# Patient Record
Sex: MALE
[De-identification: names, ages, dates, MRNs, and addresses within clinical notes are randomized per-mention and may not be internally consistent; named-entity substitution may affect disease eponyms.]

---

## 2017-10-25 ENCOUNTER — HOSPITAL ENCOUNTER (EMERGENCY)
Dept: HOSPITAL 14 - H.ER | Age: 72
Discharge: HOME | End: 2017-10-25
Payer: MEDICARE

## 2017-10-25 VITALS
OXYGEN SATURATION: 100 % | SYSTOLIC BLOOD PRESSURE: 144 MMHG | RESPIRATION RATE: 16 BRPM | DIASTOLIC BLOOD PRESSURE: 89 MMHG | TEMPERATURE: 97.6 F | HEART RATE: 75 BPM

## 2017-10-25 VITALS — BODY MASS INDEX: 26.4 KG/M2

## 2017-10-25 DIAGNOSIS — J44.9: ICD-10-CM

## 2017-10-25 DIAGNOSIS — G30.9: ICD-10-CM

## 2017-10-25 DIAGNOSIS — F02.80: ICD-10-CM
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
## 2017-10-25 DIAGNOSIS — S62.91XA: Primary | ICD-10-CM

## 2017-10-25 NOTE — ED PDOC
Upper Extremity Pain/Injury


Time Seen by Provider: 10/25/17 21:52


Chief Complaint (Nursing): Abnormal Skin Integrity


Chief Complaint (Provider): hand injury


History Per: Patient


Additional Complaint(s): 





72yo M in ED for eval of fall injury-sustained tonight via a FOOSH injury with 

injury to face- pt has a neurological  d/o causes stability issues(lewy body 

dementia)-which is why he trip on uneven curb and fell. negative for: LOC, 

headache, nausea vomiting vision changes dizziness change in speech, mentation, 

numbness.tingling to UE/LE, CP, SOB. 


PT with pain to right hand-fifth digit with swelling deformity and tingling/

numbness and pain with ROM.  





Past Medical History


Reviewed: Historical Data, Nursing Documentation, Vital Signs


Vital Signs: 


 Last Vital Signs











Temp  97.6 F   10/25/17 21:42


 


Pulse  75   10/25/17 21:42


 


Resp  16   10/25/17 21:42


 


BP  144/89   10/25/17 21:42


 


Pulse Ox  100   10/25/17 21:42














- Medical History


PMH: Alzheimer's Disease, Anemia, COPD, Dementia, Fractures (ribs)


   Denies: Chronic Kidney Disease





- Surgical History


Surgical History: Appendectomy





- Family History


Family History: States: No Known Family Hx





- Home Medications


Home Medications: 


 Ambulatory Orders











 Medication  Instructions  Recorded


 


Folic Acid 0.4 mg PO DAILY 02/22/15


 


Memantine [Namenda] 10 mg PO BID 02/22/15


 


Mirabegron [Myrbetriq] 50 mg PO DAILY 02/22/15


 


Bupropion HCl [Bupropion 150 mg PO DAILY 09/04/15





Hydrochloride Xl]  


 


Donepezil HCl [Donepezil HCl] 10 mg PO DAILY 09/04/15


 


Ergocalciferol [Calciferol] 5,000 iu PO DAILY 09/04/15


 


Finasteride [Proscar] 5 mg PO DAILY 09/04/15


 


Ibuprofen [Motrin] 400 mg PO Q6 #30 tab 10/25/17














- Allergies


Allergies/Adverse Reactions: 


 Allergies











Allergy/AdvReac Type Severity Reaction Status Date / Time


 


morphine Allergy  ANAPHYLAXIS Verified 10/25/17 21:42














Review of Systems


ROS Statement: Except As Marked, All Systems Reviewed And Found Negative


Musculoskeletal: Positive for: Hand Pain





Physical Exam





- Reviewed


Nursing Documentation Reviewed: Yes


Vital Signs Reviewed: Yes





- Physical Exam


Appears: Positive for: Well, Non-toxic, No Acute Distress


Skin: Positive for: Normal Color, Warm, DRY


Cardiovascular/Chest: Positive for: Regular Rate, Rhythm


Respiratory: Positive for: CNT, Normal Breath Sounds


Extremity: Positive for: Other (right hand: 5th digit-swelling deformity pain 

nuerovasc intact)


Neurologic/Psych: Positive for: Alert, Oriented





- ECG


O2 Sat by Pulse Oximetry: 100





- Radiology


X-Ray: Interpreted by Me


X-Ray Interpretation: Fracture





- Progress


ED Course And Treament: 





Pt will get xray, motrin for pain and ice pack to area ring place don ring 

finger right hand was removed with effort-lubrication was used. 





Medical Decision Making


Medical Decision Making: 





pt placed in an ulnar gutter-given motrin 600mg


will have f.u with MD bart pt is a [facility] pt. 





Procedures





- Splinting


Location: right


Hand-Made Type: orthoglass


Splint: thumb spica


Pre-Proc Neuro Vasc Exam: normal


Post-Proc Neuro Vasc Exam: normal





Disposition





- Clinical Impression


Clinical Impression: 


 Hand fracture








- Patient ED Disposition


Is Patient to be Admitted: No


Counseled Patient/Family Regarding: Studies Performed, Diagnosis, Need For 

Followup, Rx Given





- Disposition


Referrals: 


Darlene Mendez MD [Staff Provider] - 


Disposition: Routine/Home


Disposition Time: 22:39


Condition: STABLE


Prescriptions: 


Ibuprofen [Motrin] 400 mg PO Q6 #30 tab


Instructions:  Hand Fracture (ED)

## 2017-10-26 NOTE — RAD
PROCEDURE:  Right Hand Radiographs.



HISTORY:

trauma attn fith digit



COMPARISON:

None.



FINDINGS:



BONES:

Fracture at the base of the proximal phalanx 5th digit. The finding 

is marked on the study for review.  



JOINTS:

Normal. No osteoarthritic changes. 



SOFT TISSUES:

Soft tissue swelling attests to the acuity of the fracture. 



OTHER FINDINGS:

None.



IMPRESSION:

Non articular fracture proximal aspect proximal phalanx right 5th 

digit. 



_____________________________________________



Please note: No preliminary interpretation of this examination 

rendered by emergency department personnel (Physician and/or PA 

declined to provide preliminary report of their findings/ 

observations).

## 2018-02-27 ENCOUNTER — HOSPITAL ENCOUNTER (INPATIENT)
Dept: HOSPITAL 31 - C.ER | Age: 73
LOS: 6 days | Discharge: NURSING, REHAB OR LONG-TERM CARE | DRG: 563 | End: 2018-03-05
Attending: INTERNAL MEDICINE | Admitting: INTERNAL MEDICINE
Payer: MEDICARE

## 2018-02-27 VITALS — BODY MASS INDEX: 26.4 KG/M2

## 2018-02-27 DIAGNOSIS — S42.201A: Primary | ICD-10-CM
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
## 2018-02-27 DIAGNOSIS — Z87.81: ICD-10-CM

## 2018-02-27 DIAGNOSIS — F02.80: ICD-10-CM
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
## 2018-02-27 DIAGNOSIS — Z87.891: ICD-10-CM

## 2018-02-27 DIAGNOSIS — J44.9: ICD-10-CM

## 2018-02-27 DIAGNOSIS — Z90.49: ICD-10-CM

## 2018-02-27 DIAGNOSIS — S20.219A: ICD-10-CM

## 2018-02-27 DIAGNOSIS — G30.9: ICD-10-CM

## 2018-02-27 DIAGNOSIS — Z87.440: ICD-10-CM

## 2018-02-27 DIAGNOSIS — N39.0: ICD-10-CM

## 2018-02-27 LAB
ALBUMIN SERPL-MCNC: 3.9 G/DL (ref 3.5–5)
ALBUMIN/GLOB SERPL: 1.3 {RATIO} (ref 1–2.1)
ALT SERPL-CCNC: 28 U/L (ref 21–72)
APTT BLD: 34 SECONDS (ref 21–34)
AST SERPL-CCNC: 36 U/L (ref 17–59)
BASOPHILS # BLD AUTO: 0 K/UL (ref 0–0.2)
BASOPHILS NFR BLD: 0.3 % (ref 0–2)
BUN SERPL-MCNC: 25 MG/DL (ref 9–20)
CALCIUM SERPL-MCNC: 8.8 MG/DL (ref 8.6–10.4)
EOSINOPHIL # BLD AUTO: 0 K/UL (ref 0–0.7)
EOSINOPHIL NFR BLD: 0.4 % (ref 0–4)
ERYTHROCYTE [DISTWIDTH] IN BLOOD BY AUTOMATED COUNT: 12.4 % (ref 11.5–14.5)
GFR NON-AFRICAN AMERICAN: > 60
HGB BLD-MCNC: 13.9 G/DL (ref 12–18)
INR PPP: 1.1
LYMPHOCYTES # BLD AUTO: 0.9 K/UL (ref 1–4.3)
LYMPHOCYTES NFR BLD AUTO: 10.2 % (ref 20–40)
MCH RBC QN AUTO: 31.9 PG (ref 27–31)
MCHC RBC AUTO-ENTMCNC: 35 G/DL (ref 33–37)
MCV RBC AUTO: 91.1 FL (ref 80–94)
MONOCYTES # BLD: 0.8 K/UL (ref 0–0.8)
MONOCYTES NFR BLD: 8.4 % (ref 0–10)
NEUTROPHILS # BLD: 7.4 K/UL (ref 1.8–7)
NEUTROPHILS NFR BLD AUTO: 80.7 % (ref 50–75)
NRBC BLD AUTO-RTO: 0 % (ref 0–2)
PLATELET # BLD: 161 K/UL (ref 130–400)
PMV BLD AUTO: 9.6 FL (ref 7.2–11.7)
PROTHROMBIN TIME: 12 SECONDS (ref 9.7–12.2)
RBC # BLD AUTO: 4.36 MIL/UL (ref 4.4–5.9)
WBC # BLD AUTO: 9.2 K/UL (ref 4.8–10.8)

## 2018-02-27 NOTE — CP.PCM.HP
Present on Admission





- Present on Admission


Any Indicators Present on Admission: No





Past Patient History





- Infectious Disease


Hx of Infectious Diseases: None





- Tetanus Immunizations


Tetanus Immunization: Unknown





- Past Medical History & Family History


Past Medical History?: Yes





- Past Social History


Smoking Status: Former Smoker





- CARDIAC


Hx Hypotension: Yes (orthostatic)





- PULMONARY


Hx Chronic Obstructive Pulmonary Disease (COPD): Yes





- NEUROLOGICAL


Hx Alzheimer's Disease: Yes


Hx Dementia: Yes





- HEENT


Hx Cataracts: Yes





- RENAL


Hx Chronic Kidney Disease: No





- ENDOCRINE/METABOLIC


Hx Endocrine Disorders: No





- HEMATOLOGICAL/ONCOLOGICAL


Hx Anemia: Yes





- INTEGUMENTARY


Hx Dermatological Problems: No





- MUSCULOSKELETAL/RHEUMATOLOGICAL


Hx Fractures: Yes (ribs)





- GASTROINTESTINAL


Hx Gastrointestinal Disorders: No





- GENITOURINARY/GYNECOLOGICAL


Hx Prostate Problems: Yes


Hx Urinary Tract Infection: Yes


Other/Comment: frequent urine infections





- PSYCHIATRIC


Hx Substance Use: No





- SURGICAL HISTORY


Hx Appendectomy: Yes





- ANESTHESIA


Hx Anesthesia: Yes


Hx Anesthesia Reactions: No





Meds


Allergies/Adverse Reactions: 


 Allergies











Allergy/AdvReac Type Severity Reaction Status Date / Time


 


morphine Allergy  ANAPHYLAXIS Verified 02/27/18 16:58














Results





- Vital Signs


Recent Vital Signs: 





 Last Vital Signs











Temp  98.3 F   02/27/18 16:53


 


Pulse  83   02/27/18 17:09


 


Resp  18   02/27/18 17:09


 


BP  200/95 H  02/27/18 17:09


 


Pulse Ox  96   02/27/18 19:13














- Labs


Result Diagrams: 


 02/27/18 19:46





 02/27/18 19:46


Labs: 





 Laboratory Results - last 24 hr











  02/27/18 02/27/18 02/27/18





  19:46 19:46 19:46


 


WBC  9.2  


 


RBC  4.36 L  


 


Hgb  13.9  


 


Hct  39.7  


 


MCV  91.1  


 


MCH  31.9 H  


 


MCHC  35.0  


 


RDW  12.4  


 


Plt Count  161  


 


MPV  9.6  


 


Neut % (Auto)  80.7 H  


 


Lymph % (Auto)  10.2 L  


 


Mono % (Auto)  8.4  


 


Eos % (Auto)  0.4  


 


Baso % (Auto)  0.3  


 


Neut # (Auto)  7.4 H  


 


Lymph # (Auto)  0.9 L  


 


Mono # (Auto)  0.8  


 


Eos # (Auto)  0.0  


 


Baso # (Auto)  0.0  


 


PT   12.0 


 


INR   1.1 


 


APTT   34 


 


Sodium    142


 


Potassium    4.3


 


Chloride    103


 


Carbon Dioxide    30


 


Anion Gap    13


 


BUN    25 H


 


Creatinine    0.9


 


Est GFR ( Amer)    > 60


 


Est GFR (Non-Af Amer)    > 60


 


Random Glucose    145 H


 


Calcium    8.8


 


Total Bilirubin    0.7


 


AST    36


 


ALT    28


 


Alkaline Phosphatase    53


 


Total Protein    6.9


 


Albumin    3.9


 


Globulin    3.0


 


Albumin/Globulin Ratio    1.3


 


Blood Type   


 


Antibody Screen   














  02/27/18





  19:46


 


WBC 


 


RBC 


 


Hgb 


 


Hct 


 


MCV 


 


MCH 


 


MCHC 


 


RDW 


 


Plt Count 


 


MPV 


 


Neut % (Auto) 


 


Lymph % (Auto) 


 


Mono % (Auto) 


 


Eos % (Auto) 


 


Baso % (Auto) 


 


Neut # (Auto) 


 


Lymph # (Auto) 


 


Mono # (Auto) 


 


Eos # (Auto) 


 


Baso # (Auto) 


 


PT 


 


INR 


 


APTT 


 


Sodium 


 


Potassium 


 


Chloride 


 


Carbon Dioxide 


 


Anion Gap 


 


BUN 


 


Creatinine 


 


Est GFR ( Amer) 


 


Est GFR (Non-Af Amer) 


 


Random Glucose 


 


Calcium 


 


Total Bilirubin 


 


AST 


 


ALT 


 


Alkaline Phosphatase 


 


Total Protein 


 


Albumin 


 


Globulin 


 


Albumin/Globulin Ratio 


 


Blood Type  A POSITIVE


 


Antibody Screen  Negative














Assessment & Plan





- Assessment and Plan (Free Text)


Plan: 





orthofollwup


nemaned


protonix


amy lvoenox


scd


other mx as ordered


will monitor cbc closelyh 


may need rehab

## 2018-02-27 NOTE — C.PDOC
History Of Present Illness


72-year-old male presents to the ED for evaluation of right shoulder pain which 

began after he sustained a fall PTA. Patient states he accidentally tripped 

over his foot and fell onto his right side. Patient notes he hit the right side 

of his head on the floor, but denies LOC. The fall was witnessed by his wife, 

who is present at bedside. Patient also denies symptoms prior to fall such as 

chest pain, palpitations, SOB, headache, dizziness, sensory changes, extremity 

weakness.


Time Seen by Provider: 02/27/18 17:05


Chief Complaint (Nursing): Upper Extremity Problem/Injury


History Per: Patient, Family


History/Exam Limitations: no limitations


Onset/Duration Of Symptoms: Hrs


Current Symptoms Are (Timing): Better


Quality: "Pain"


Severity: Mild


Additional History Per: Patient





Past Medical History


Reviewed: Historical Data, Nursing Documentation, Vital Signs


Vital Signs: 


 Last Vital Signs











Temp  98.5 F   03/05/18 07:00


 


Pulse  76   03/05/18 07:00


 


Resp  18   03/05/18 07:00


 


BP  146/80   03/05/18 07:00


 


Pulse Ox  95   03/05/18 07:00














- Medical History


PMH: Alzheimer's Disease, Anemia, COPD, Dementia, Fractures (ribs)


Surgical History: Appendectomy





- [facility] Procedures








INJECT/INFUSE NEC (01/18/15)








Family History: States: No Known Family Hx





- Social History


Hx Alcohol Use: No


Hx Substance Use: No





Review Of Systems


Except As Marked, All Systems Reviewed And Found Negative.


Cardiovascular: Negative for: Chest Pain, Palpitations


Respiratory: Negative for: Shortness of Breath


Gastrointestinal: Negative for: Nausea, Vomiting, Abdominal Pain, Diarrhea


Musculoskeletal: Positive for: Shoulder Pain (right )


Neurological: Positive for: Other (+head injury, no LOC ).  Negative for: 

Weakness, Numbness, Confusion, Seizures, Dizziness





Physical Exam





- Physical Exam


Appears: Well, Non-toxic, No Acute Distress


Skin: Normal Color, Warm, Dry


Head: Normacephalic, Other (erythematous mild wound/abrasion to right temporal 

area)


Eye(s): bilateral: Normal Inspection


Oral Mucosa: Moist


Neck: Normal, Normal ROM, No Midline Cervical Tenderness, No Paracervical 

Tenderness, No Step Off Deformity, Supple


Cardiovascular: Rhythm Regular


Respiratory: Normal Breath Sounds, No Rales, No Rhonchi, No Wheezing


Gastrointestinal/Abdominal: Normal Exam, Bowel Sounds, Soft, No Tenderness, No 

Guarding, No Rebound


Extremity: Normal ROM, Capillary Refill (< 2sec all digits ), No Deformity, 

Swelling (moderate swelling at right proximal humerus, (+) diffuse TTP)


Extremity: Bilateral: Normal Color And Temperature


Pulses: Left Radial: Normal, Right Radial: Normal


Neurological/Psych: Oriented x3, Normal Speech, Normal Cognition, Normal 

Cranial Nerves, No Cerebellar Signs, Normal Motor, Normal Sensation


Gait: Steady





ED Course And Treatment





- Laboratory Results


Result Diagrams: 


 03/03/18 07:58





 03/03/18 07:58


ECG: Interpreted By Me, Viewed By Me (sinus rhtyhm 83 bpm, 1st degree AV block, 

normal axis, no acute ST/T wave changes)


ECG Interpretation: Abnormal


O2 Sat by Pulse Oximetry: 96 (on RA)


Pulse Ox Interpretation: Normal





- Other Rad


  ** RIGHT SHOULDER XRAY


X-Ray: Interpreted by Me, Viewed By Me (proximal humerus fx at surgical neck, 

no dislocation)





- CT Scan/US


  ** CT HEAD


Other Rad Studies (CT/US): Read By Radiologist, Radiology Report Reviewed


CT/US Interpretation: Accession No. : [id number].  Patient Name / ID : MAX [id number]  / [id number].  Exam Date : 02/27/2018 17:41:07 ( Approved ).  Study 

Comment :  Sex / Age : M  / 72Y.  Creator : Yuliana Benjamin.  Dictator : Reyna Sanderson MD.   :  Approver : Reyna Sanderson MD.  

Approver2 :  Report Date : 02/27/2018 17:48:58.  My Comment :  *****************

******************************************************************.  PROCEDURE:

  CT HEAD WITHOUT CONTRAST.  HISTORY:  head injury r/o bleed.  COMPARISON:  

None available.  TECHNIQUE:  Axial computed tomography images were obtained 

through the head/brain without intravenous contrast.  Radiation dose:  Total 

exam DLP = 992.81 mGy-cm.  This CT exam was performed using one or more of the 

following dose reduction techniques: Automated exposure control, adjustment of 

the mA and/or kV according to patient size, and/or use of iterative 

reconstruction technique.  FINDINGS:  HEMORRHAGE:  No intracranial hemorrhage.  

BRAIN:  Diffuse atrophy with prominence of the ventricles and sulci noted. No 

mass effect or edema. Patchy hypodense region in the left temporal lobe may 

reflect ischemic change. Scattered periventricular and subcortical white matter 

hypodensities, which are nonspecific, but often seen with chronic microvascular 

ischemic disease.  VENTRICLES:  No hydrocephalus.  CALVARIUM:  Unremarkable.  

PARANASAL SINUSES:  Unremarkable as visualized. No significant inflammatory 

changes.  MASTOID AIR CELLS:  Unremarkable as visualized. No inflammatory 

changes.  OTHER FINDINGS:  None.  IMPRESSION:  Generalized atrophy.  Patchy 

hypodense region in the left temporal lobe may reflect ischemic change.  

Scattered periventricular and subcortical white matter hypodensities, which are 

nonspecific, but often seen with chronic microvascular ischemic disease.  

Please note that MRI with diffusion imaging is more sensitive in the detection 

of acute ischemic event.


Progress Note: Xray of right shoulder and CT head ordered and reviewed.  

Patient given PO Tylenol.  As per wife, patient has h/o Lewy body dementia that 

is worsening by IV narcotics such as morphine.  PO tylenol #3 given instead for 

pain, wife is comfortable with that plan.  Wife requests ortho consult Dr. Randall.





- Physician Consult Information


Physician Contacted: Lorin Mak


Outcome Of Conversation: Discussed patient with Dr. DIANE Mak, agrees with 

admission to his service with Dr. Randall for orthopedics.





Disposition





- Disposition


Disposition: HOSPITALIZED


Disposition Time: 18:50


Condition: STABLE





- Clinical Impression


Clinical Impression: 


 Closed fracture of right proximal humerus








- Scribe Statement


The provider has reviewed the documentation as recorded by the Scribe (Rosaura Mak)


Provider Attestation: 








All medical record entries made by the Scribe were at my direction and 

personally dictated by me. I have reviewed the chart and agree that the record 

accurately reflects my personal performance of the history, physical exam, 

medical decision making, and the department course for this patient. I have 

also personally directed, reviewed, and agree with the discharge instructions 

and disposition.

## 2018-02-27 NOTE — CT
PROCEDURE:  CT HEAD WITHOUT CONTRAST.



HISTORY:

head injury r/o bleed



COMPARISON:

None available. 



TECHNIQUE:

Axial computed tomography images were obtained through the head/brain 

without intravenous contrast.  



Radiation dose:



Total exam DLP = 992.81 mGy-cm.



This CT exam was performed using one or more of the following dose 

reduction techniques: Automated exposure control, adjustment of the 

mA and/or kV according to patient size, and/or use of iterative 

reconstruction technique.



FINDINGS:



HEMORRHAGE:

No intracranial hemorrhage. 



BRAIN:

Diffuse atrophy with prominence of the ventricles and sulci noted. No 

mass effect or edema. Patchy hypodense region in the left temporal 

lobe may reflect ischemic change. Scattered periventricular and 

subcortical white matter hypodensities, which are nonspecific, but 

often seen with chronic microvascular ischemic disease.



VENTRICLES:

No hydrocephalus. 



CALVARIUM:

Unremarkable.



PARANASAL SINUSES:

Unremarkable as visualized. No significant inflammatory changes.



MASTOID AIR CELLS:

Unremarkable as visualized. No inflammatory changes.



OTHER FINDINGS:

None.



IMPRESSION:

Generalized atrophy.



Patchy hypodense region in the left temporal lobe may reflect 

ischemic change. 



Scattered periventricular and subcortical white matter hypodensities, 

which are nonspecific, but often seen with chronic microvascular 

ischemic disease. 



Please note that MRI with diffusion imaging is more sensitive in the 

detection of acute ischemic event.

## 2018-02-27 NOTE — CT
EXAM:

  CT Right Upper Extremity Without Intravenous Contrast, Shoulder



CLINICAL HISTORY:

  72 years old, male; Injury or trauma; Fall; Initial encounter; Fracture, 

traumatic injury; Closed fracture; Humerus; Right; Additional info: Rt shoulder 

pain



TECHNIQUE:

  Axial computed tomography images of the right shoulder without intravenous 

contrast.  All CT scans at this facility use one or more dose reduction 

techniques, viz.: automated exposure control; ma/kV adjustment per patient size 

(including targeted exams where dose is matched to indication; i.e. head); or 

iterative reconstruction technique.

  Coronal and sagittal reformatted images were created and reviewed.



COMPARISON:

  No relevant prior studies available.



FINDINGS:

  Bones/joints:  Comminuted, displaced, impacted fracture surgical neck of 

humerus.  Severe degenerative changes of glenohumeral joint.  Mild degenerative 

changes of acromioclavicular joint.  Degenerative changes of cervical spine.  

No dislocation.  Glenohumeral joint effusion with few foci of air.

  Soft tissues:  Soft tissue swelling/stranding about shoulder girdle.  

  Lungs:  Few pulmonary nodules, up to 0.5 cm.



IMPRESSION:     

1.  Right proximal humerus fracture.

2.  Pulmonary nodules.  For low-risk patients, no follow-up is necessary.  For 

high-risk patients (smoking history or other known risk factors) an optional CT 

at 12 months could be performed.

3.  Incidental/non-acute findings are described above.

## 2018-02-28 LAB
BILIRUB UR-MCNC: NEGATIVE MG/DL
GLUCOSE UR STRIP-MCNC: NORMAL MG/DL
LEUKOCYTE ESTERASE UR-ACNC: (no result) LEU/UL
PH UR STRIP: 5 [PH] (ref 5–8)
PROT UR STRIP-MCNC: NEGATIVE MG/DL
RBC # UR STRIP: NEGATIVE /UL
SP GR UR STRIP: 1.02 (ref 1–1.03)
UROBILINOGEN UR-MCNC: 2 MG/DL (ref 0.2–1)

## 2018-02-28 RX ADMIN — PANTOPRAZOLE SODIUM SCH MG: 40 TABLET, DELAYED RELEASE ORAL at 10:33

## 2018-02-28 RX ADMIN — ENOXAPARIN SODIUM SCH MG: 40 INJECTION SUBCUTANEOUS at 09:43

## 2018-02-28 NOTE — RAD
PROCEDURE:  Radiographs of the Right Shoulder



HISTORY:

s/p fall r/o fx







COMPARISON:

No prior.



FINDINGS:



BONES:

There is a transverse, impacted fracture through the surgical neck of 

the proximal right humerus without dislocation. Fracture appears 

impacted without malalignment. Prominent acromioclavicular and 

glenohumeral joint degenerative changes are appreciated.



JOINTS:

As above.



SOFT TISSUES:

Normal.



OTHER FINDINGS:

None.



IMPRESSION:

Proximal right humeral fracture without dislocation. Mild impaction 

noted at fracture site.

## 2018-02-28 NOTE — CP.PCM.PN
Subjective





- Date & Time of Evaluation


Date of Evaluation: 18


Time of Evaluation: 08:16





- Subjective


Subjective: 





Orthopedic eval Dr. Randall, full consultation to follow





72M complains of right shoulder pain after fall last night. At this time, 

patient is confused, and requires prompting but does follow commands. He denies 

any numbness or tingling. He denies pain in other extremities, denies headache, 

neck pain, or back pain. 





Objective





- Vital Signs/Intake and Output


Vital Signs (last 24 hours): 


 











Temp Pulse Resp BP Pulse Ox


 


 98.7 F   100 H  19   151/86 H  95 


 


 18 05:54  18 07:55  18 07:55  18 07:55  18 07:55











- Medications


Medications: 


 Current Medications





Citalopram Hydrobromide (Celexa)  1 mg PO DAILY [facility]


Donepezil HCl (Aricept)  1 mg PO DAILY [facility]


Finasteride (Proscar)  5 mg PO DAILY [facility]


Fludrocortisone Acetate (Florinef)  1 mg PO DAILY [facility]


Home Med (Midodrine [Proamatine])  1 tab PO TID [facility]


Home Med (Mirabegron [Myrbetriq])  50 mg PO DAILY [facility]


Home Med (Pyridostigmine [Mestinon])  1 tab PO TID [facility]


Home Med (Solifenacin Succinate [Vesicare])  1 tab PO DAILY [facility]


Hydromorphone HCl (Dilaudid)  0.5 mg IVP Q8H PRN


   PRN Reason: Pain, moderate (4-7)


   Last Admin: 18 08:00 Dose:  0.5 mg


Memantine (Namenda)  10 mg PO BID [facility]


Pantoprazole Sodium (Protonix Ec Tab)  40 mg PO DAILY [facility]











- Labs


Labs: 


 





 18 19:46 





 18 19:46 





 











PT  12.0 SECONDS (9.7-12.2)   18  19:46    


 


INR  1.1   18  19:46    


 


APTT  34 SECONDS (21-34)   18  19:46    














- Constitutional


Appears: Well, No Acute Distress





- Neck Exam


Neck Exam: Full ROM, Normal Inspection





- Extremities Exam


Additional comments: 





Right shoulder: noted swelling to right shoulder, skin intact, TTP to shoulder, 

+radial pulse, sensation intact to RUE, +ROM fingers, +thumb ext, finger add/abd

, extension, flexion


No swelling or tenderness to right elbow/forearm/wrist


BLE/LUE +radial pulse, no swelling/deformity discoloration





- Neurological Exam


Neurological Exam: Alert, Awake





- Skin


Skin Exam: Dry, Normal Color, Warm (swelling)





Assessment and Plan


(1) Closed fracture of right proximal humerus


Assessment & Plan: 


Imaging reviewed by Dr. Randall


fracture is non operative, continue sling, ice, pain medication


diet ordered


d/w Dr. Randall, agrees with above


Status: Acute   





Results





- Vital Signs


Recent Vital Signs: 


 Last Vital Signs











Temp  98.7 F   18 05:54


 


Pulse  100 H  18 07:55


 


Resp  19   18 07:55


 


BP  151/86 H  18 07:55


 


Pulse Ox  95   18 07:55














- Labs


Result Diagrams: 


 18 19:46





 18 19:46


Labs: 


 Laboratory Results - last 24 hr











  18





  19:46 19:46 19:46


 


WBC  9.2  


 


RBC  4.36 L  


 


Hgb  13.9  


 


Hct  39.7  


 


MCV  91.1  


 


MCH  31.9 H  


 


MCHC  35.0  


 


RDW  12.4  


 


Plt Count  161  


 


MPV  9.6  


 


Neut % (Auto)  80.7 H  


 


Lymph % (Auto)  10.2 L  


 


Mono % (Auto)  8.4  


 


Eos % (Auto)  0.4  


 


Baso % (Auto)  0.3  


 


Neut # (Auto)  7.4 H  


 


Lymph # (Auto)  0.9 L  


 


Mono # (Auto)  0.8  


 


Eos # (Auto)  0.0  


 


Baso # (Auto)  0.0  


 


PT   12.0 


 


INR   1.1 


 


APTT   34 


 


Sodium    142


 


Potassium    4.3


 


Chloride    103


 


Carbon Dioxide    30


 


Anion Gap    13


 


BUN    25 H


 


Creatinine    0.9


 


Est GFR ( Amer)    > 60


 


Est GFR (Non-Af Amer)    > 60


 


Random Glucose    145 H


 


Calcium    8.8


 


Total Bilirubin    0.7


 


AST    36


 


ALT    28


 


Alkaline Phosphatase    53


 


Total Protein    6.9


 


Albumin    3.9


 


Globulin    3.0


 


Albumin/Globulin Ratio    1.3


 


Blood Type   


 


Antibody Screen   














  18





  19:46


 


WBC 


 


RBC 


 


Hgb 


 


Hct 


 


MCV 


 


MCH 


 


MCHC 


 


RDW 


 


Plt Count 


 


MPV 


 


Neut % (Auto) 


 


Lymph % (Auto) 


 


Mono % (Auto) 


 


Eos % (Auto) 


 


Baso % (Auto) 


 


Neut # (Auto) 


 


Lymph # (Auto) 


 


Mono # (Auto) 


 


Eos # (Auto) 


 


Baso # (Auto) 


 


PT 


 


INR 


 


APTT 


 


Sodium 


 


Potassium 


 


Chloride 


 


Carbon Dioxide 


 


Anion Gap 


 


BUN 


 


Creatinine 


 


Est GFR ( Amer) 


 


Est GFR (Non-Af Amer) 


 


Random Glucose 


 


Calcium 


 


Total Bilirubin 


 


AST 


 


ALT 


 


Alkaline Phosphatase 


 


Total Protein 


 


Albumin 


 


Globulin 


 


Albumin/Globulin Ratio 


 


Blood Type  A POSITIVE


 


Antibody Screen  Negative














- Impressions


Impression: 





Patient Name / ID : MAX POLO  / [id number]


Exam Date : 2018 22:31:04 ( Approved )


Study Comment : 


Sex / Age : M  / 72Y





Creator : Seferino Garcia MD


Dictator : 


 : 


Approver : Seferino aGrcia MD


Approver2 : 





Report Date : 2018 23:01:00


My Comment : 


********************************************************************************

***





[facility] Division of Radiology  


 [address] [place] [address]  


 Tel. no. [phone]   


 


 


Patient Name: CHRIST SANTANA JR            Account #: [id number]  


Pt. Address: [address] [place] [address] Rec #: [id number]  


                    [place] [address]            Ordering Dr: Obdulio HERNANDEZ,

Milton GOMEZ  


Pt Phone: [phone]                             [place] [facility]  


: 1945 Male Age: 72            Order #: [id number]                     

                 


             Accession #: [id number]  


Reason for exam: rt shoulder pain   


 


 


 


 


 


 CT Scan  


 


 


EXT UPPER W/O CONTRAST RIGHT                              Exam Date: 18  


 


This imaging exam was performed at [facility]  


EXAM:  


   CT Right Upper Extremity Without Intravenous Contrast, Shoulder  


 


 CLINICAL HISTORY:  


   72 years old, male; Injury or trauma; Fall; Initial encounter; Fracture,   


 traumatic injury; Closed fracture; Humerus; Right; Additional info: Rt 

shoulder   


 pain  


 


 TECHNIQUE:  


   Axial computed tomography images of the right shoulder without intravenous   


 contrast.  All CT scans at this facility use one or more dose reduction   


 techniques, viz.: automated exposure control; ma/kV adjustment per patient 

size   


 (including targeted exams where dose is matched to indication; i.e. head); or 

  


 iterative reconstruction technique.  


   Coronal and sagittal reformatted images were created and reviewed.  


 


 COMPARISON:  


   No relevant prior studies available.  


 


 FINDINGS:  


   Bones/joints:  Comminuted, displaced, impacted fracture surgical neck of   


 humerus.  Severe degenerative changes of glenohumeral joint.  Mild 

degenerative   


 changes of acromioclavicular joint.  Degenerative changes of cervical spine.  

  


 No dislocation.  Glenohumeral joint effusion with few foci of air.  


   Soft tissues:  Soft tissue swelling/stranding about shoulder girdle.    


   Lungs:  Few pulmonary nodules, up to 0.5 cm.  


 


 IMPRESSION:       


 1.  Right proximal humerus fracture.  


 2.  Pulmonary nodules.  For low-risk patients, no follow-up is necessary.  For

   


 high-risk patients (smoking history or other known risk factors) an optional 

CT   


 at 12 months could be performed.  


 3.  Incidental/non-acute findings are described above.  


 


                                                            Dictated By:  

Seferino Garcia MD      


                                                            Dictated Date/Time:

  18  


                                                            Signed By: Seferino Garcia MD  


                                                            Date Signed: 2301  


                                                Transcribed By: [facility]  


                                                            Transcribe Date/Time

: 18  


ACODALYS02/MT





Defer mgmt of nodules to medical team

## 2018-02-28 NOTE — CARD
--------------- APPROVED REPORT --------------





EKG Measurement

Heart [id number]

LA [id number]

[id number]

[id number]

[id number]



<Conclusion>

Sinus rhythm with 1st degree AV block

Otherwise normal ECG

## 2018-02-28 NOTE — CP.PCM.PN
Subjective





- Date & Time of Evaluation


Date of Evaluation: 02/28/18


Time of Evaluation: 16:20





- Subjective


Subjective: 


clinically same





Objective





- Vital Signs/Intake and Output


Vital Signs (last 24 hours): 


 











Temp Pulse Resp BP Pulse Ox


 


 99.9 F H  87   18   143/94 H  95 


 


 02/28/18 17:03  02/28/18 17:03  02/28/18 17:03  02/28/18 17:03  02/28/18 17:03











- Medications


Medications: 


 Current Medications





Citalopram Hydrobromide (Celexa)  10 mg PO DAILY [facility]


   Last Admin: 02/28/18 10:38 Dose:  10 mg


Donepezil HCl (Aricept)  10 mg PO [facility]


Enoxaparin Sodium (Lovenox)  40 mg SC DAILY [facility]


   Last Admin: 02/28/18 09:43 Dose:  40 mg


Finasteride (Proscar)  5 mg PO DAILY [facility]


   Last Admin: 02/28/18 10:33 Dose:  5 mg


Fludrocortisone Acetate (Florinef)  0.1 mg PO DAILY [facility]


   Last Admin: 02/28/18 10:38 Dose:  0.1 mg


Home Med (Solifenacin Succinate [Vesicare])  1 tab PO DAILY [facility]


Home Med (Pyridostigmine [Mestinon])  1 tab PO TID [facility]


Home Med (Mirabegron [Myrbetriq])  50 mg PO DAILY [facility]


Home Med (Midodrine [Proamatine])  1 tab PO TID [facility]


Hydromorphone HCl (Dilaudid)  0.5 mg IVP Q8H PRN


   PRN Reason: Pain, moderate (4-7)


   Last Admin: 02/28/18 08:00 Dose:  0.5 mg


Memantine (Namenda)  10 mg PO BID [facility]


   Last Admin: 02/28/18 10:33 Dose:  10 mg


Pantoprazole Sodium (Protonix Ec Tab)  40 mg PO DAILY [facility]


   Last Admin: 02/28/18 10:33 Dose:  40 mg











- Labs


Labs: 


 





 02/27/18 19:46 





 02/27/18 19:46 





 











PT  12.0 SECONDS (9.7-12.2)   02/27/18  19:46    


 


INR  1.1   02/27/18  19:46    


 


APTT  34 SECONDS (21-34)   02/27/18  19:46

## 2018-02-28 NOTE — RAD
PROCEDURE:  CHEST RADIOGRAPH, 1 VIEW



HISTORY:

admission



COMPARISON:

None available.



FINDINGS:



LUNGS:

Clear.



PLEURA:

No pneumothorax or pleural fluid seen.



CARDIOVASCULAR:

Normal.



OSSEOUS STRUCTURES:

No significant abnormalities.



VISUALIZED UPPER ABDOMEN:

Normal.



OTHER FINDINGS:

None. 



IMPRESSION:

No acute cardiopulmonary disease appreciated.

## 2018-03-01 RX ADMIN — PANTOPRAZOLE SODIUM SCH MG: 40 TABLET, DELAYED RELEASE ORAL at 10:59

## 2018-03-01 RX ADMIN — ENOXAPARIN SODIUM SCH MG: 40 INJECTION SUBCUTANEOUS at 11:00

## 2018-03-01 NOTE — CP.PCM.PN
Subjective





- Date & Time of Evaluation


Date of Evaluation: 03/01/18


Time of Evaluation: 11:40





- Subjective


Subjective: 


clinically same





Objective





- Vital Signs/Intake and Output


Vital Signs (last 24 hours): 


 











Temp Pulse Resp BP Pulse Ox


 


 98.0 F   80   20   142/84   96 


 


 03/01/18 15:00  03/01/18 15:00  03/01/18 15:00  03/01/18 15:00  03/01/18 15:00








Intake and Output: 


 











 03/01/18 03/01/18





 06:59 18:59


 


Output Total 175 


 


Balance -175 














- Medications


Medications: 


 Current Medications





Acetaminophen (Tylenol 325mg Tab)  650 mg PO Q6 PRN


   PRN Reason: Pain, moderate (4-7)


Citalopram Hydrobromide (Celexa)  10 mg PO DAILY [facility]


   Last Admin: 03/01/18 11:04 Dose:  10 mg


Donepezil HCl (Aricept)  10 mg PO [facility]


Enoxaparin Sodium (Lovenox)  40 mg SC DAILY [facility]


   Last Admin: 03/01/18 11:00 Dose:  40 mg


Finasteride (Proscar)  5 mg PO DAILY [facility]


   Last Admin: 02/28/18 10:33 Dose:  5 mg


Fludrocortisone Acetate (Florinef)  0.1 mg PO DAILY [facility]


   Last Admin: 02/28/18 10:38 Dose:  0.1 mg


Home Med (Patient's Own Medication)  1 tab PO DAILY [facility]


   Last Admin: 03/01/18 10:58 Dose:  1 tab


Home Med (Patient's Own Medication)  1 tab PO TID [facility]


   Last Admin: 03/01/18 10:57 Dose:  1 tab


Home Med (Patient's Own Medication)  1 tab PO DAILY [facility]


   Last Admin: 03/01/18 10:58 Dose:  1 tab


Home Med (Patient's Own Medication)  1 tab PO 0800,1200,1700 [facility]


   Last Admin: 03/01/18 11:02 Dose:  1 tab


Memantine (Namenda)  10 mg PO BID [facility]


   Last Admin: 03/01/18 10:59 Dose:  10 mg


Pantoprazole Sodium (Protonix Ec Tab)  40 mg PO DAILY [facility]


   Last Admin: 03/01/18 10:59 Dose:  40 mg











- Labs


Labs: 


 





 02/27/18 19:46 





 02/27/18 19:46 





 











PT  12.0 SECONDS (9.7-12.2)   02/27/18  19:46    


 


INR  1.1   02/27/18  19:46    


 


APTT  34 SECONDS (21-34)   02/27/18  19:46    














- Constitutional


Appears: Well





- Head Exam


Head Exam: ATRAUMATIC, NORMAL INSPECTION, NORMOCEPHALIC





- Eye Exam


Eye Exam: EOMI, Normal appearance, PERRL


Pupil Exam: NORMAL ACCOMODATION, PERRL





- ENT Exam


ENT Exam: Mucous Membranes Moist, Normal Exam





- Neck Exam


Neck Exam: Full ROM, Normal Inspection.  absent: Lymphadenopathy





- Respiratory Exam


Respiratory Exam: Decreased Breath Sounds





- Cardiovascular Exam


Cardiovascular Exam: REGULAR RHYTHM, +S1





- GI/Abdominal Exam


GI & Abdominal Exam: Soft, Diminished Bowel Sounds





- Rectal Exam


Rectal Exam: Deferred

## 2018-03-01 NOTE — CP.PCM.PN
Subjective





- Date & Time of Evaluation


Date of Evaluation: 03/01/18


Time of Evaluation: 07:45





- Subjective


Subjective: 





Patient confused. Follows commands with prompting. 





Review of Systems





- Review of Systems


Systems not reviewed;Unavailable: Dementia





Objective





- Vital Signs/Intake and Output


Vital Signs (last 24 hours): 


 











Temp Pulse Resp BP Pulse Ox


 


 99.3 F   88   20   159/86 H  95 


 


 03/01/18 00:00  03/01/18 05:00  03/01/18 05:00  03/01/18 05:00  03/01/18 05:00








Intake and Output: 


 











 03/01/18 03/01/18





 06:59 18:59


 


Output Total 175 


 


Balance -175 














- Medications


Medications: 


 Current Medications





Citalopram Hydrobromide (Celexa)  10 mg PO DAILY [facility]


   Last Admin: 02/28/18 10:38 Dose:  10 mg


Donepezil HCl (Aricept)  10 mg PO [facility]


Enoxaparin Sodium (Lovenox)  40 mg SC DAILY [facility]


   Last Admin: 02/28/18 09:43 Dose:  40 mg


Finasteride (Proscar)  5 mg PO DAILY [facility]


   Last Admin: 02/28/18 10:33 Dose:  5 mg


Fludrocortisone Acetate (Florinef)  0.1 mg PO DAILY [facility]


   Last Admin: 02/28/18 10:38 Dose:  0.1 mg


Home Med (Solifenacin Succinate [Vesicare])  1 tab PO DAILY [facility]


Home Med (Pyridostigmine [Mestinon])  1 tab PO TID [facility]


Home Med (Mirabegron [Myrbetriq])  50 mg PO DAILY [facility]


Home Med (Midodrine [Proamatine])  1 tab PO TID [facility]


Hydromorphone HCl (Dilaudid)  0.5 mg IVP Q8H PRN


   PRN Reason: Pain, moderate (4-7)


   Last Admin: 02/28/18 08:00 Dose:  0.5 mg


Memantine (Namenda)  10 mg PO BID [facility]


   Last Admin: 02/28/18 22:01 Dose:  10 mg


Pantoprazole Sodium (Protonix Ec Tab)  40 mg PO DAILY [facility]


   Last Admin: 02/28/18 10:33 Dose:  40 mg











- Labs


Labs: 


 





 02/27/18 19:46 





 02/27/18 19:46 





 











PT  12.0 SECONDS (9.7-12.2)   02/27/18  19:46    


 


INR  1.1   02/27/18  19:46    


 


APTT  34 SECONDS (21-34)   02/27/18  19:46    














- Constitutional


Appears: Well, No Acute Distress





- Head Exam


Head Exam: ATRAUMATIC





- Respiratory Exam


Respiratory Exam: NORMAL BREATHING PATTERN





- Extremities Exam


Additional comments: 





RUE: shoulder swollen


sensation intact to rad/med/ulnar/ax nerve





- Neurological Exam


Neurological Exam: Alert, Awake


Neuro motor strength exam: Right Lower Extremity: 5 (+ROM fingers/wrist flex/

ext sensation intact, thumb ext)





- Psychiatric Exam


Additional comments: 





calm





- Skin


Skin Exam: Dry, Intact, Normal Color, Warm





Assessment and Plan


(1) Closed fracture of right proximal humerus


Assessment & Plan: 


conservative mgmt


shoulder immobilizer at all times


ice to right shoulder


pain meds prn


PT/OT, NWB RUE


orthopedically stable for d/c, f/u in office in 2 weeks call for appointment


d/w Dr. Randall, agrees with above


Status: Acute

## 2018-03-01 NOTE — CON
DATE:



The patient was admitted by Dr. LEANN Mak with diagnosis of right humeral

fracture.



Examination revealed an elderly male, who is somewhat disoriented,

complaining of pain in the right shoulder.  2+ swelling of the shoulder

noted.  Range of motion of the shoulder is painful.  No crepitus noted. 

Tenderness over the anterior and posterior aspects of the shoulder noted.



X-rays revealed a fracture of the proximal humerus.  CAT scan reveals a

fracture of the proximal humerus with no intraarticular component.



DIAGNOSES:

1.  Fracture of the right humerus proximal, surgical neck, and treatment at

this point is non-operative.  CAT scan did not show any head-splitting

component.  The patient will be placed in a shoulder immobilizer.

2.  Ice for the shoulder.  We will start the patient on physical therapy

including gradual mobilization with range of motion exercises.  I also

discussed with patient's wife regarding the treatment plan.  We will follow

the patient.





__________________________________________

Serina Randall MD





DD:  02/28/2018 16:32:05

DT:  02/28/2018 18:33:10

Job # [id number]

## 2018-03-02 LAB
ALBUMIN SERPL-MCNC: 3.4 G/DL (ref 3.5–5)
ALBUMIN/GLOB SERPL: 1.1 {RATIO} (ref 1–2.1)
ALT SERPL-CCNC: 27 U/L (ref 21–72)
AST SERPL-CCNC: 21 U/L (ref 17–59)
BASOPHILS # BLD AUTO: 0 K/UL (ref 0–0.2)
BASOPHILS NFR BLD: 0.6 % (ref 0–2)
BUN SERPL-MCNC: 23 MG/DL (ref 9–20)
CALCIUM SERPL-MCNC: 8.7 MG/DL (ref 8.6–10.4)
EOSINOPHIL # BLD AUTO: 0.1 K/UL (ref 0–0.7)
EOSINOPHIL NFR BLD: 1.3 % (ref 0–4)
ERYTHROCYTE [DISTWIDTH] IN BLOOD BY AUTOMATED COUNT: 12.2 % (ref 11.5–14.5)
GFR NON-AFRICAN AMERICAN: > 60
HGB BLD-MCNC: 13.1 G/DL (ref 12–18)
LYMPHOCYTES # BLD AUTO: 1.1 K/UL (ref 1–4.3)
LYMPHOCYTES NFR BLD AUTO: 15.8 % (ref 20–40)
MCH RBC QN AUTO: 32.1 PG (ref 27–31)
MCHC RBC AUTO-ENTMCNC: 34.9 G/DL (ref 33–37)
MCV RBC AUTO: 91.7 FL (ref 80–94)
MONOCYTES # BLD: 0.9 K/UL (ref 0–0.8)
MONOCYTES NFR BLD: 12.3 % (ref 0–10)
NEUTROPHILS # BLD: 5 K/UL (ref 1.8–7)
NEUTROPHILS NFR BLD AUTO: 70 % (ref 50–75)
NRBC BLD AUTO-RTO: 0 % (ref 0–2)
PLATELET # BLD: 148 K/UL (ref 130–400)
PMV BLD AUTO: 9.7 FL (ref 7.2–11.7)
RBC # BLD AUTO: 4.09 MIL/UL (ref 4.4–5.9)
WBC # BLD AUTO: 7.2 K/UL (ref 4.8–10.8)

## 2018-03-02 RX ADMIN — ENOXAPARIN SODIUM SCH MG: 40 INJECTION SUBCUTANEOUS at 09:54

## 2018-03-02 RX ADMIN — PANTOPRAZOLE SODIUM SCH MG: 40 TABLET, DELAYED RELEASE ORAL at 09:48

## 2018-03-02 NOTE — CP.PCM.PN
Subjective





- Date & Time of Evaluation


Date of Evaluation: 03/02/18


Time of Evaluation: 08:00





- Subjective


Subjective: 





Patient follows commands, confused doesn't answer questions appropriately.





Review of Systems





- Review of Systems


Systems not reviewed;Unavailable: Dementia





Objective





- Vital Signs/Intake and Output


Vital Signs (last 24 hours): 


 











Temp Pulse Resp BP Pulse Ox


 


 97.5 F L  82   20   153/75 H  94 L


 


 03/01/18 23:20  03/01/18 23:20  03/01/18 23:20  03/01/18 23:20  03/01/18 23:20








Intake and Output: 


 











 03/02/18 03/02/18





 06:59 18:59


 


Output Total 450 


 


Balance -450 














- Medications


Medications: 


 Current Medications





Acetaminophen (Tylenol 325mg Tab)  650 mg PO Q6 PRN


   PRN Reason: Pain, moderate (4-7)


Citalopram Hydrobromide (Celexa)  10 mg PO DAILY [facility]


   Last Admin: 03/01/18 11:04 Dose:  10 mg


Donepezil HCl (Aricept)  10 mg PO HS [facility]


   Last Admin: 03/01/18 21:57 Dose:  10 mg


Enoxaparin Sodium (Lovenox)  40 mg SC DAILY [facility]


   Last Admin: 03/01/18 11:00 Dose:  40 mg


Finasteride (Proscar)  5 mg PO DAILY [facility]


   Last Admin: 03/01/18 16:39 Dose:  Not Given


Fludrocortisone Acetate (Florinef)  0.1 mg PO DAILY [facility]


   Last Admin: 03/01/18 16:38 Dose:  Not Given


Home Med (Patient's Own Medication)  1 tab PO DAILY [facility]


   Last Admin: 03/01/18 10:58 Dose:  1 tab


Home Med (Patient's Own Medication)  1 tab PO TID [facility]


   Last Admin: 03/01/18 17:31 Dose:  1 tab


Home Med (Patient's Own Medication)  1 tab PO DAILY [facility]


   Last Admin: 03/01/18 10:58 Dose:  1 tab


Home Med (Patient's Own Medication)  1 tab PO 0800,1200,1700 [facility]


   Last Admin: 03/02/18 08:26 Dose:  1 tab


Memantine (Namenda)  10 mg PO BID [facility]


   Last Admin: 03/01/18 17:30 Dose:  10 mg


Pantoprazole Sodium (Protonix Ec Tab)  40 mg PO DAILY [facility]


   Last Admin: 03/01/18 10:59 Dose:  40 mg











- Labs


Labs: 


 





 02/27/18 19:46 





 02/27/18 19:46 





 











PT  12.0 SECONDS (9.7-12.2)   02/27/18  19:46    


 


INR  1.1   02/27/18  19:46    


 


APTT  34 SECONDS (21-34)   02/27/18  19:46    














- Constitutional


Appears: Well, No Acute Distress





- Head Exam


Head Exam: ATRAUMATIC





- Neck Exam


Neck Exam: Normal Inspection





- Respiratory Exam


Respiratory Exam: NORMAL BREATHING PATTERN





- Extremities Exam


Additional comments: 


RUE: shoulder swollen


sensation intact to rad/med/ulnar/ax nerve








- Neurological Exam


Neuro motor strength exam: Right Upper Extremity: 5 (5/5  strength, 4/5 

finger extension and wrist extension (requires prompting to follow commands))





- Skin


Skin Exam: Dry, Intact, Normal Color, Warm


Additional comments: 





+ecchymosis





Assessment and Plan


(1) Closed fracture of right proximal humerus


Assessment & Plan: 


non operative


continue shoulder immobilizer


PT/OT


ice


pain meds prn


f/u 2 weeks after discharge office Dr. Randall call for appt


d/w Dr. Randall, agrees with above


Status: Acute

## 2018-03-02 NOTE — CP.PCM.PN
Subjective





- Date & Time of Evaluation


Date of Evaluation: 03/02/18


Time of Evaluation: 14:35





- Subjective


Subjective: 


PT SEEN BY ORTHO THIS  MORNING AND CLEARED FOR D/C.  ALSO SEEN BY DR. DIANE ALLISON 

AND CLEARED FOR D/C TODAY.  PT IS TO GO TO [facility] TODAY.  TO F/U IN 2 WEEKS 

WITH DR. CHIANG IN THE OFFICE. SUDHEER KEARNEY PA AWARE THAT PT WILL GO TO [facility].








-PLACE UNDER THE SERVICE OF DR. [facility]---CALL DR. ALLISON UPON ARRIVAL WITH BED ASSIGNMENT AND FOR ADMITTING 


 ORDERS.


-CONTINUE MEDICATIONS PER THE MED REC FORM.  CHANGES CAN BE MADE BY DR. ALLISON.


-FALL PRECAUTIONS PER FACILITY PROTOCOL.


-ORTHOPEDIC RECOMMENDATIONS: CONTINUE SHOULDER IMMOBILIZER; PT/OT AS TOLERATED; 

ICE TO AFFECTED AREA; PAIN MEDICATIONS AS NEEDED; FOLLOW UP WITH DR. CHIANG IN THE 

OFFICE IN 2 WEEKS (BY 3/16/18).


-FOR FURTHER QUESTIONS OR ORDERS, CONTACT DR. DIANE ALLISON. 





Objective





- Vital Signs/Intake and Output


Vital Signs (last 24 hours): 


 











Temp Pulse Resp BP Pulse Ox


 


 98.2 F   78   18   137/83   94 L


 


 03/02/18 08:50  03/02/18 08:50  03/02/18 08:50  03/02/18 08:50  03/02/18 08:50








Intake and Output: 


 











 03/02/18 03/02/18





 06:59 18:59


 


Output Total 450 300


 


Balance -450 -300














- Medications


Medications: 


 Current Medications





Acetaminophen (Tylenol 325mg Tab)  650 mg PO Q6 PRN


   PRN Reason: Pain, moderate (4-7)


Citalopram Hydrobromide (Celexa)  10 mg PO DAILY [facility]


   Last Admin: 03/02/18 09:48 Dose:  10 mg


Donepezil HCl (Aricept)  10 mg PO HS [facility]


   Last Admin: 03/01/18 21:57 Dose:  10 mg


Enoxaparin Sodium (Lovenox)  40 mg SC DAILY [facility]


   Last Admin: 03/02/18 09:54 Dose:  40 mg


Finasteride (Proscar)  5 mg PO DAILY [facility]


   Last Admin: 03/02/18 09:50 Dose:  5 mg


Fludrocortisone Acetate (Florinef)  0.1 mg PO DAILY [facility]


   Last Admin: 03/02/18 09:48 Dose:  0.1 mg


Home Med (Patient's Own Medication)  1 tab PO DAILY [facility]


   Last Admin: 03/02/18 09:46 Dose:  1 tab


Home Med (Patient's Own Medication)  1 tab PO TID [facility]


   Last Admin: 03/02/18 13:24 Dose:  1 tab


Home Med (Patient's Own Medication)  1 tab PO DAILY [facility]


   Last Admin: 03/02/18 09:46 Dose:  1 tab


Home Med (Patient's Own Medication)  1 tab PO 0800,1200,1700 [facility]


   Last Admin: 03/02/18 13:25 Dose:  1 tab


Memantine (Namenda)  10 mg PO BID [facility]


   Last Admin: 03/02/18 09:48 Dose:  10 mg


Pantoprazole Sodium (Protonix Ec Tab)  40 mg PO DAILY [facility]


   Last Admin: 03/02/18 09:48 Dose:  40 mg











- Labs


Labs: 


 





 03/02/18 13:45 





 03/02/18 13:45 





 











PT  12.0 SECONDS (9.7-12.2)   02/27/18  19:46    


 


INR  1.1   02/27/18  19:46    


 


APTT  34 SECONDS (21-34)   02/27/18  19:46

## 2018-03-02 NOTE — CP.PCM.PN
Subjective





- Date & Time of Evaluation


Date of Evaluation: 03/02/18


Time of Evaluation: 11:20





- Subjective


Subjective: 


clinically same





Objective





- Vital Signs/Intake and Output


Vital Signs (last 24 hours): 


 











Temp Pulse Resp BP Pulse Ox


 


 98.6 F   77   20   157/90 H  96 


 


 03/02/18 16:40  03/02/18 16:40  03/02/18 16:40  03/02/18 16:40  03/02/18 16:40








Intake and Output: 


 











 03/02/18 03/02/18





 06:59 18:59


 


Output Total 450 300


 


Balance -450 -300














- Medications


Medications: 


 Current Medications





Acetaminophen (Tylenol 325mg Tab)  650 mg PO Q6 PRN


   PRN Reason: Pain, moderate (4-7)


Citalopram Hydrobromide (Celexa)  10 mg PO DAILY [facility]


   Last Admin: 03/02/18 09:48 Dose:  10 mg


Donepezil HCl (Aricept)  10 mg PO HS [facility]


   Last Admin: 03/01/18 21:57 Dose:  10 mg


Enoxaparin Sodium (Lovenox)  40 mg SC DAILY [facility]


   Last Admin: 03/02/18 09:54 Dose:  40 mg


Finasteride (Proscar)  5 mg PO DAILY [facility]


   Last Admin: 03/02/18 09:50 Dose:  5 mg


Fludrocortisone Acetate (Florinef)  0.1 mg PO DAILY [facility]


   Last Admin: 03/02/18 09:48 Dose:  0.1 mg


Home Med (Patient's Own Medication)  1 tab PO DAILY [facility]


   Last Admin: 03/02/18 09:46 Dose:  1 tab


Home Med (Patient's Own Medication)  1 tab PO TID [facility]


   Last Admin: 03/02/18 17:31 Dose:  1 tab


Home Med (Patient's Own Medication)  1 tab PO DAILY [facility]


   Last Admin: 03/02/18 09:46 Dose:  1 tab


Home Med (Patient's Own Medication)  1 tab PO 0800,1200,1700 [facility]


   Last Admin: 03/02/18 17:31 Dose:  1 tab


Memantine (Namenda)  10 mg PO BID [facility]


   Last Admin: 03/02/18 17:30 Dose:  10 mg


Pantoprazole Sodium (Protonix Ec Tab)  40 mg PO DAILY [facility]


   Last Admin: 03/02/18 09:48 Dose:  40 mg











- Labs


Labs: 


 





 03/02/18 13:45 





 03/02/18 13:45 





 











PT  12.0 SECONDS (9.7-12.2)   02/27/18  19:46    


 


INR  1.1   02/27/18  19:46    


 


APTT  34 SECONDS (21-34)   02/27/18  19:46    














- Constitutional


Appears: Well





- Head Exam


Head Exam: ATRAUMATIC, NORMAL INSPECTION, NORMOCEPHALIC





- Eye Exam


Eye Exam: EOMI, Normal appearance, PERRL


Pupil Exam: NORMAL ACCOMODATION, PERRL





- ENT Exam


ENT Exam: Mucous Membranes Moist, Normal Exam





- Neck Exam


Neck Exam: Full ROM, Normal Inspection.  absent: Lymphadenopathy





- Respiratory Exam


Respiratory Exam: Decreased Breath Sounds





- Cardiovascular Exam


Cardiovascular Exam: REGULAR RHYTHM, +S1, +S2





- GI/Abdominal Exam


GI & Abdominal Exam: Soft, Diminished Bowel Sounds





- Rectal Exam


Rectal Exam: Deferred

## 2018-03-03 LAB
ALBUMIN SERPL-MCNC: 3.4 G/DL (ref 3.5–5)
ALBUMIN/GLOB SERPL: 1.2 {RATIO} (ref 1–2.1)
ALT SERPL-CCNC: 27 U/L (ref 21–72)
AST SERPL-CCNC: 21 U/L (ref 17–59)
BASOPHILS # BLD AUTO: 0 K/UL (ref 0–0.2)
BASOPHILS NFR BLD: 0.6 % (ref 0–2)
BUN SERPL-MCNC: 24 MG/DL (ref 9–20)
CALCIUM SERPL-MCNC: 8.3 MG/DL (ref 8.6–10.4)
EOSINOPHIL # BLD AUTO: 0.1 K/UL (ref 0–0.7)
EOSINOPHIL NFR BLD: 2.3 % (ref 0–4)
ERYTHROCYTE [DISTWIDTH] IN BLOOD BY AUTOMATED COUNT: 12.2 % (ref 11.5–14.5)
GFR NON-AFRICAN AMERICAN: > 60
HGB BLD-MCNC: 12.7 G/DL (ref 12–18)
LYMPHOCYTES # BLD AUTO: 0.9 K/UL (ref 1–4.3)
LYMPHOCYTES NFR BLD AUTO: 15.2 % (ref 20–40)
MCH RBC QN AUTO: 32 PG (ref 27–31)
MCHC RBC AUTO-ENTMCNC: 35.4 G/DL (ref 33–37)
MCV RBC AUTO: 90.5 FL (ref 80–94)
MONOCYTES # BLD: 0.7 K/UL (ref 0–0.8)
MONOCYTES NFR BLD: 12.4 % (ref 0–10)
NEUTROPHILS # BLD: 4 K/UL (ref 1.8–7)
NEUTROPHILS NFR BLD AUTO: 69.5 % (ref 50–75)
NRBC BLD AUTO-RTO: 0 % (ref 0–2)
PLATELET # BLD: 154 K/UL (ref 130–400)
PMV BLD AUTO: 9.6 FL (ref 7.2–11.7)
RBC # BLD AUTO: 3.96 MIL/UL (ref 4.4–5.9)
WBC # BLD AUTO: 5.7 K/UL (ref 4.8–10.8)

## 2018-03-03 RX ADMIN — ENOXAPARIN SODIUM SCH MG: 40 INJECTION SUBCUTANEOUS at 10:25

## 2018-03-03 RX ADMIN — PANTOPRAZOLE SODIUM SCH MG: 40 TABLET, DELAYED RELEASE ORAL at 10:16

## 2018-03-04 RX ADMIN — PANTOPRAZOLE SODIUM SCH MG: 40 TABLET, DELAYED RELEASE ORAL at 10:00

## 2018-03-04 RX ADMIN — ENOXAPARIN SODIUM SCH MG: 40 INJECTION SUBCUTANEOUS at 10:00

## 2018-03-04 NOTE — CP.PCM.CON
History of Present Illness





- History of Present Illness


History of Present Illness: 





72-year-old male presents to the ED for evaluation of right shoulder pain which 

began after he sustained a fall PTA. Patient states he accidentally tripped 

over his foot and fell onto his right side. Patient notes he hit the right side 

of his head, but denies LOC. 


pt currently awake but confused  


CT Head reportedly neg


Ortho on board for fx prox humerus


CT chest shows infiltrates 








- Medical History


PMH: Alzheimer's Disease, Anemia, COPD, Dementia, Fractures (ribs)


   Denies: Chronic Kidney Disease





Review of Systems





- Review of Systems


Systems not reviewed;Unavailable: Altered Mental Status


All systems: reviewed and no additional remarkable complaints except





- Constitutional


Constitutional: As Per HPI





- EENT


Eyes: absent: As Per HPI, Blind Spots, Blurred Vision, Change in Vision, 

Decreased Night Vision, Diplopia, Discharge, Dry Eye, Exophthalmos, Floaters, 

Irritation, Itchy Eyes, Loss of Peripheral Vision, Pain, Photophobia, Requires 

Corrective Lenses, Sees Flashes, Spots in Vision, Tunnel Vision, Other Visual 

Disturbances, Loss of Vision, Other


Ears: absent: As Per HPI, Decreased Hearing, Ear Discharge, Ear Pain, Tinnitus, 

Abnormal Hearing, Disequilibrium, Dizziness, Other


Nose/Mouth/Throat: absent: As Per HPI, Epistaxis, Nasal Congestion, Nasal 

Discharge, Nasal Obstruction, Nasal Trauma, Nose Pain, Post Nasal Drip, Sinus 

Pain, Sinus Pressure, Bleeding Gums, Change in Voice, Dental Pain, Dry Mouth, 

Dysphagia, Halitosis, Hoarsness, Lip Swelling, Mouth Lesions, Mouth Pain, 

Odynophagia, Sore Throat, Throat Swelling, Tongue Swelling, Facial Pain, Neck 

Pain, Neck Mass, Other





- Cardiovascular


Cardiovascular: absent: As Per HPI, Acrocyanosis, Chest Pain, Chest Pain at Rest

, Chest Pain with Activity, Claudication, Diaphoresis, Dyspnea, Dyspnea on 

Exertion, Edema, Irregular Heart Rhythm, Pain Radiating to Arm/Neck/Jaw, Leg 

Edema, Leg Ulcers, Lightheadedness, Orthopnea, Palpitations, Paroxysmal 

Nocturnal Dyspnea, Pedal Edema, Radiating Pain, Rapid Heart Rate, Slow Heart 

Rate, Syncope, Other





- Respiratory


Respiratory: As Per HPI





- Gastrointestinal


Gastrointestinal: absent: As Per HPI, Abdominal Pain, Belching, Bloating, 

Change in Bowel Habits, Change in Stool Character, Coffee Ground Emesis, 

Constipation, Cramping, Diarrhea, Dyspepsia, Dysphagia, Early Satiety, 

Excessive Flatus, Fecal Incontinence, Heartburn, Hematemesis, Hematochezia, 

Loose Stools, Melena, Nausea, Odynophagia, Temesmus, Vomiting, Other





- Genitourinary


Genitourinary: absent: As Per HPI, Change in Urinary Stream, Difficulty 

Urinating, Dysuria, Flank Pain, Hematuria, Pyuria, Nocturia, Urinary 

Incontinence, Urinary Frequency, Urinary Hesitance, Urinary Urgency, Voiding 

Freq/Small Amts, Freq UTI, Hx Renal/Bladder Calculi, Hx /Renal Surgery, 

Bladder Distension, Other





- Musculoskeletal


Musculoskeletal: As Per HPI





- Integumentary


Integumentary: absent: As Per HPI, Acne, Alopecia, Bleeding Lesions, Change in 

Hair, Change in Nails, Change in Pigmentation, Changing Lesions, Dry Skin, 

Erythema, Furuncle, Hirsutism, Lesions, New Lesions, Non-Healing Lesions, 

Photosensitivity, Pruritus, Rash, Skin Pain, Skin Ulcer, Sores, Striae, Swelling

, Unusual Bruising, Wounds, Jaundice, Other





- Neurological


Neurological: As Per HPI





- Psychiatric


Psychiatric: absent: As Per HPI, Abnormal Sleep Pattern, Anhedonia, Anxiety, 

Auditory Hallucinations, Behavioral Changes, Change in Appetite, Change in 

Libido, Confusion, Depression, Difficulty Concentrating, Hallucinations, 

Homicidal Ideation, Hopelessness, Irritability, Memory Loss, Mood Swings, Panic 

Attacks, Paranoia, Suicidal Ideation, Visual Hallucinations, Tactile 

Hallucinations, Other





- Endocrine


Endocrine: absent: As Per HPI, Change in Body Appearance, Change in Libido, 

Cold Intolorance, Deepening of Voice, Excessive Sweating, Fatigue, Flushing, 

Heat Intolorance, Increase in Ring/Shoe/Hat Size, Palpitations, Polydipsia, 

Polyphagia, Polyuria, Other





- Hematologic/Lymphatic


Hematologic: absent: As Per HPI, Easy Bleeding, Easy Bruising, Lymphadenopathy, 

Other





Past Patient History





- Infectious Disease


Hx of Infectious Diseases: None





- Tetanus Immunizations


Tetanus Immunization: Unknown





- Past Medical History & Family History


Past Medical History?: Yes





- Past Social History


Smoking Status: Former Smoker





- CARDIAC


Hx Hypotension: Yes (orthostatic)





- PULMONARY


Hx Chronic Obstructive Pulmonary Disease (COPD): Yes





- NEUROLOGICAL


Hx Alzheimer's Disease: Yes


Hx Dementia: Yes





- HEENT


Hx Cataracts: Yes





- RENAL


Hx Chronic Kidney Disease: No





- ENDOCRINE/METABOLIC


Hx Endocrine Disorders: No





- HEMATOLOGICAL/ONCOLOGICAL


Hx Anemia: Yes





- INTEGUMENTARY


Hx Dermatological Problems: No





- MUSCULOSKELETAL/RHEUMATOLOGICAL


Hx Falls: Yes (3/27/18)


Hx Fractures: Yes (ribs)





- GASTROINTESTINAL


Hx Gastrointestinal Disorders: No





- GENITOURINARY/GYNECOLOGICAL


Hx Prostate Problems: Yes


Hx Urinary Tract Infection: Yes


Other/Comment: frequent urine infections





- PSYCHIATRIC


Hx Substance Use: No





- SURGICAL HISTORY


Hx Appendectomy: Yes





- ANESTHESIA


Hx Anesthesia: Yes


Hx Anesthesia Reactions: No


Hx Malignant Hyperthermia: No


Has any member of the family had a problem w/ anesthesia?: No





Meds


Home Medications: 


 Home Medication List











 Medication  Instructions  Recorded  Confirmed  Type


 


Acetaminophen [Tylenol 325mg tab] 650 mg PO Q6 PRN  tab 03/02/18  Rx


 


Finasteride [Proscar] 5 mg PO DAILY  tab 03/02/18  Rx


 


Solifenacin Succinate [Vesicare] 1 tab PO DAILY 03/02/18  Rx











Allergies/Adverse Reactions: 


 Allergies











Allergy/AdvReac Type Severity Reaction Status Date / Time


 


morphine Allergy  ANAPHYLAXIS Verified 02/27/18 16:58














- Medications


Medications: 


 Current Medications





Acetaminophen (Tylenol 325mg Tab)  650 mg PO Q6 PRN


   PRN Reason: Pain, moderate (4-7)


Acetaminophen (Tylenol 325mg Tab)  650 mg PO Q6 PRN


   PRN Reason: Fever >100.4 F


Citalopram Hydrobromide (Celexa)  10 mg PO DAILY [facility]


   Last Admin: 03/04/18 10:03 Dose:  10 mg


Donepezil HCl (Aricept)  10 mg PO HS [facility]


   Last Admin: 03/03/18 21:01 Dose:  10 mg


Enoxaparin Sodium (Lovenox)  40 mg SC DAILY [facility]


   Last Admin: 03/04/18 10:00 Dose:  40 mg


Finasteride (Proscar)  5 mg PO DAILY [facility]


   Last Admin: 03/04/18 10:00 Dose:  5 mg


Fludrocortisone Acetate (Florinef)  0.1 mg PO DAILY [facility]


   Last Admin: 03/04/18 10:03 Dose:  0.1 mg


Home Med (Patient's Own Medication)  1 tab PO DAILY [facility]


   Last Admin: 03/04/18 10:02 Dose:  1 tab


Home Med (Patient's Own Medication)  1 tab PO TID [facility]


   Last Admin: 03/04/18 13:09 Dose:  1 tab


Home Med (Patient's Own Medication)  1 tab PO DAILY [facility]


   Last Admin: 03/04/18 10:01 Dose:  1 tab


Home Med (Patient's Own Medication)  1 tab PO 0800,1200,1700 [facility]


   Last Admin: 03/04/18 12:14 Dose:  1 tab


Memantine (Namenda)  10 mg PO BID [facility]


   Last Admin: 03/04/18 10:00 Dose:  10 mg


Pantoprazole Sodium (Protonix Ec Tab)  40 mg PO DAILY [facility]


   Last Admin: 03/04/18 10:00 Dose:  40 mg











Physical Exam





- Constitutional


Appears: No Acute Distress, Confused, Chronically Ill





- Head Exam


Head Exam: NORMOCEPHALIC





- Eye Exam


Eye Exam: absent: Scleral icterus





- ENT Exam


ENT Exam: Mucous Membranes Dry, Normal External Ear Exam





- Neck Exam


Neck exam: Negative for: Lymphadenopathy





- Respiratory Exam


Respiratory Exam: Decreased Breath Sounds, Rhonchi





- Cardiovascular Exam


Cardiovascular Exam: REGULAR RHYTHM, +S1, +S2





- GI/Abdominal Exam


GI & Abdominal Exam: Diminished Bowel Sounds, Soft.  absent: Tenderness





- Rectal Exam


Rectal Exam: Deferred





-  Exam


 Exam: NORMAL INSPECTION





- Extremities Exam


Extremities exam: Positive for: pedal pulses present.  Negative for: calf 

tenderness, pedal edema, tenderness





- Back Exam


Back exam: absent: CVA tenderness (L), CVA tenderness (R), paraspinal tenderness





- Neurological Exam


Neurological exam: Alert, Altered, CN II-XII Intact





- Psychiatric Exam


Psychiatric exam: Depressed





- Skin


Skin Exam: Dry





Results





- Vital Signs


Recent Vital Signs: 


 Last Vital Signs











Temp  98 F   03/04/18 07:45


 


Pulse  76   03/04/18 07:45


 


Resp  18   03/04/18 07:45


 


BP  143/87   03/04/18 07:45


 


Pulse Ox  97   03/04/18 07:45














- Labs


Result Diagrams: 


 03/03/18 07:58





 03/03/18 07:58





Assessment & Plan


(1) Closed fracture of right proximal humerus


Status: Acute   





(2) Dementia


Status: Acute   





(3) Hand fracture


Status: Acute   





(4) Rib contusion


Status: Acute   





(5) Sepsis


Status: Acute   





- Assessment and Plan (Free Text)


Assessment: 





recc  pulm eval for infiltrates


await cultures


cont iv antibiotics

## 2018-03-04 NOTE — CP.PCM.PN
Subjective





- Date & Time of Evaluation


Date of Evaluation: 03/04/18


Time of Evaluation: 10:50





- Subjective


Subjective: 


clinically same





Objective





- Vital Signs/Intake and Output


Vital Signs (last 24 hours): 


 











Temp Pulse Resp BP Pulse Ox


 


 98 F   76   18   143/87   97 


 


 03/04/18 07:45  03/04/18 07:45  03/04/18 07:45  03/04/18 07:45  03/04/18 07:45








Intake and Output: 


 











 03/04/18 03/04/18





 06:59 18:59


 


Intake Total 320 480


 


Output Total 200 


 


Balance 120 480














- Medications


Medications: 


 Current Medications





Acetaminophen (Tylenol 325mg Tab)  650 mg PO Q6 PRN


   PRN Reason: Pain, moderate (4-7)


Acetaminophen (Tylenol 325mg Tab)  650 mg PO Q6 PRN


   PRN Reason: Fever >100.4 F


Citalopram Hydrobromide (Celexa)  10 mg PO DAILY [facility]


   Last Admin: 03/04/18 10:03 Dose:  10 mg


Donepezil HCl (Aricept)  10 mg PO HS [facility]


   Last Admin: 03/03/18 21:01 Dose:  10 mg


Enoxaparin Sodium (Lovenox)  40 mg SC DAILY [facility]


   Last Admin: 03/04/18 10:00 Dose:  40 mg


Finasteride (Proscar)  5 mg PO DAILY [facility]


   Last Admin: 03/04/18 10:00 Dose:  5 mg


Fludrocortisone Acetate (Florinef)  0.1 mg PO DAILY [facility]


   Last Admin: 03/04/18 10:03 Dose:  0.1 mg


Home Med (Patient's Own Medication)  1 tab PO DAILY [facility]


   Last Admin: 03/04/18 10:02 Dose:  1 tab


Home Med (Patient's Own Medication)  1 tab PO TID [facility]


   Last Admin: 03/04/18 13:09 Dose:  1 tab


Home Med (Patient's Own Medication)  1 tab PO DAILY [facility]


   Last Admin: 03/04/18 10:01 Dose:  1 tab


Home Med (Patient's Own Medication)  1 tab PO 0800,1200,1700 [facility]


   Last Admin: 03/04/18 12:14 Dose:  1 tab


Ceftriaxone Sodium 1 gm/ (Sodium Chloride)  100 mls @ 100 mls/hr IVPB Q12H [facility]


Memantine (Namenda)  10 mg PO BID [facility]


   Last Admin: 03/04/18 10:00 Dose:  10 mg


Pantoprazole Sodium (Protonix Ec Tab)  40 mg PO DAILY [facility]


   Last Admin: 03/04/18 10:00 Dose:  40 mg











- Labs


Labs: 


 





 03/03/18 07:58 





 03/03/18 07:58 





 











PT  12.0 SECONDS (9.7-12.2)   02/27/18  19:46    


 


INR  1.1   02/27/18  19:46    


 


APTT  34 SECONDS (21-34)   02/27/18  19:46    














- Constitutional


Appears: Well





- Head Exam


Head Exam: ATRAUMATIC, NORMAL INSPECTION, NORMOCEPHALIC





- Eye Exam


Eye Exam: EOMI, Normal appearance, PERRL


Pupil Exam: NORMAL ACCOMODATION, PERRL





- ENT Exam


ENT Exam: Mucous Membranes Moist, Normal Exam





- Neck Exam


Neck Exam: Full ROM, Normal Inspection.  absent: Lymphadenopathy





- Respiratory Exam


Respiratory Exam: Decreased Breath Sounds





- Cardiovascular Exam


Cardiovascular Exam: REGULAR RHYTHM, +S1, +S2





- GI/Abdominal Exam


GI & Abdominal Exam: Soft, Diminished Bowel Sounds





- Rectal Exam


Rectal Exam: Deferred





Assessment and Plan





- Assessment and Plan (Free Text)


Plan: 





Patient is advanced demented [place] discussed with the wife who this is the 

[facility] sent and


Feeding and encouraged to


Continue same medication


Follow-up with Dr. Melvin


Continue IV Rocephin


GI prophylaxis


DVT prophylaxis


As ordered

## 2018-03-05 VITALS
DIASTOLIC BLOOD PRESSURE: 80 MMHG | HEART RATE: 76 BPM | RESPIRATION RATE: 18 BRPM | SYSTOLIC BLOOD PRESSURE: 146 MMHG | TEMPERATURE: 98.5 F

## 2018-03-05 VITALS — OXYGEN SATURATION: 96 %

## 2018-03-05 RX ADMIN — ENOXAPARIN SODIUM SCH MG: 40 INJECTION SUBCUTANEOUS at 11:14

## 2018-03-05 RX ADMIN — PANTOPRAZOLE SODIUM SCH MG: 40 TABLET, DELAYED RELEASE ORAL at 11:14

## 2018-03-05 NOTE — CP.PCM.PN
Subjective





- Date & Time of Evaluation


Date of Evaluation: 03/05/18


Time of Evaluation: 07:00





- Subjective


Subjective: 





remains obtunded


consider Neuro eval / MRI


await fna; cultures 





Objective





- Vital Signs/Intake and Output


Vital Signs (last 24 hours): 


 











Temp Pulse Resp BP Pulse Ox


 


 98.8 F   86   20   143/81   96 


 


 03/04/18 23:00  03/04/18 23:00  03/04/18 23:00  03/04/18 23:00  03/04/18 23:00








Intake and Output: 


 











 03/05/18 03/05/18





 06:59 18:59


 


Intake Total 200 


 


Balance 200 














- Medications


Medications: 


 Current Medications





Acetaminophen (Tylenol 325mg Tab)  650 mg PO Q6 PRN


   PRN Reason: Pain, moderate (4-7)


Acetaminophen (Tylenol 325mg Tab)  650 mg PO Q6 PRN


   PRN Reason: Fever >100.4 F


Citalopram Hydrobromide (Celexa)  10 mg PO DAILY [facility]


   Last Admin: 03/05/18 11:15 Dose:  10 mg


Donepezil HCl (Aricept)  10 mg PO HS [facility]


   Last Admin: 03/04/18 21:46 Dose:  10 mg


Enoxaparin Sodium (Lovenox)  40 mg SC DAILY [facility]


   Last Admin: 03/05/18 11:14 Dose:  40 mg


Finasteride (Proscar)  5 mg PO DAILY [facility]


   Last Admin: 03/05/18 11:14 Dose:  5 mg


Fludrocortisone Acetate (Florinef)  0.1 mg PO DAILY [facility]


   Last Admin: 03/05/18 11:15 Dose:  0.1 mg


Home Med (Patient's Own Medication)  1 tab PO DAILY [facility]


   Last Admin: 03/05/18 11:17 Dose:  1 tab


Home Med (Patient's Own Medication)  1 tab PO TID [facility]


   Last Admin: 03/05/18 11:18 Dose:  1 tab


Home Med (Patient's Own Medication)  1 tab PO DAILY [facility]


   Last Admin: 03/05/18 11:15 Dose:  1 tab


Home Med (Patient's Own Medication)  1 tab PO 0800,1200,1700 [facility]


   Last Admin: 03/05/18 12:22 Dose:  1 tab


Ceftriaxone Sodium 1 gm/ (Sodium Chloride)  100 mls @ 100 mls/hr IVPB Q12H [facility]


   Last Admin: 03/05/18 04:14 Dose:  100 mls/hr


Memantine (Namenda)  10 mg PO BID [facility]


   Last Admin: 03/05/18 11:14 Dose:  10 mg


Pantoprazole Sodium (Protonix Ec Tab)  40 mg PO DAILY [facility]


   Last Admin: 03/05/18 11:14 Dose:  40 mg











- Labs


Labs: 


 





 03/03/18 07:58 





 03/03/18 07:58 





 











PT  12.0 SECONDS (9.7-12.2)   02/27/18  19:46    


 


INR  1.1   02/27/18  19:46    


 


APTT  34 SECONDS (21-34)   02/27/18  19:46    














- Constitutional


Appears: Confused, Chronically Ill





- Head Exam


Head Exam: NORMOCEPHALIC





- Eye Exam


Eye Exam: PERRL.  absent: Scleral icterus





- ENT Exam


ENT Exam: Mucous Membranes Dry





- Neck Exam


Neck Exam: absent: Lymphadenopathy





- Respiratory Exam


Respiratory Exam: Decreased Breath Sounds





- Cardiovascular Exam


Cardiovascular Exam: REGULAR RHYTHM





- GI/Abdominal Exam


GI & Abdominal Exam: Distended, Soft





- Rectal Exam


Rectal Exam: Deferred





-  Exam


 Exam: NORMAL INSPECTION





- Extremities Exam


Extremities Exam: absent: Pedal Edema





- Back Exam


Back Exam: absent: CVA tenderness (L), CVA tenderness (R)





- Neurological Exam


Neurological Exam: Altered





Assessment and Plan


(1) Closed fracture of right proximal humerus


Status: Acute   





(2) Dementia


Status: Acute   





(3) Hand fracture


Status: Acute   





(4) Rib contusion


Status: Acute   





(5) Sepsis


Status: Acute

## 2018-03-05 NOTE — CP.PCM.PN
Subjective





- Date & Time of Evaluation


Date of Evaluation: 03/05/18





Objective





- Vital Signs/Intake and Output


Vital Signs (last 24 hours): 


 











Temp Pulse Resp BP Pulse Ox


 


 98.5 F   76   18   146/80   96 


 


 03/05/18 07:00  03/05/18 07:00  03/05/18 07:00  03/05/18 07:00  03/05/18 18:18











- Labs


Labs: 


 





 03/03/18 07:58 





 03/03/18 07:58 





 











PT  12.0 SECONDS (9.7-12.2)   02/27/18  19:46    


 


INR  1.1   02/27/18  19:46    


 


APTT  34 SECONDS (21-34)   02/27/18  19:46

## 2018-03-07 LAB
ALBUMIN (PEP): 3.1 G/DL (ref 3.8–4.8)
ALPHA-1-GLOBULIN (PEP): 0.5 G/DL (ref 0.2–0.3)